# Patient Record
Sex: FEMALE | URBAN - METROPOLITAN AREA
[De-identification: names, ages, dates, MRNs, and addresses within clinical notes are randomized per-mention and may not be internally consistent; named-entity substitution may affect disease eponyms.]

---

## 2024-11-13 ENCOUNTER — TRANSCRIBE ORDERS (OUTPATIENT)
Facility: HOSPITAL | Age: 19
End: 2024-11-13

## 2024-11-13 DIAGNOSIS — M62.838 SPASM OF MUSCLE: Primary | ICD-10-CM

## 2024-12-27 ENCOUNTER — HOSPITAL ENCOUNTER (OUTPATIENT)
Facility: HOSPITAL | Age: 19
Discharge: HOME | End: 2024-12-30
Attending: STUDENT IN AN ORGANIZED HEALTH CARE EDUCATION/TRAINING PROGRAM
Payer: MEDICAID

## 2024-12-27 DIAGNOSIS — M62.838 SPASM OF MUSCLE: ICD-10-CM

## 2024-12-27 PROCEDURE — 70336 MAGNETIC IMAGE JAW JOINT: CPT

## 2025-03-06 ENCOUNTER — HOSPITAL ENCOUNTER (EMERGENCY)
Facility: HOSPITAL | Age: 20
Discharge: HOME | End: 2025-03-06
Attending: EMERGENCY MEDICINE
Payer: MEDICAID

## 2025-03-06 ENCOUNTER — APPOINTMENT (OUTPATIENT)
Facility: HOSPITAL | Age: 20
End: 2025-03-06
Payer: MEDICAID

## 2025-03-06 VITALS
RESPIRATION RATE: 15 BRPM | TEMPERATURE: 98.4 F | HEIGHT: 69 IN | DIASTOLIC BLOOD PRESSURE: 65 MMHG | OXYGEN SATURATION: 98 % | HEART RATE: 85 BPM | BODY MASS INDEX: 24.44 KG/M2 | WEIGHT: 165 LBS | SYSTOLIC BLOOD PRESSURE: 126 MMHG

## 2025-03-06 DIAGNOSIS — S03.00XA DISLOCATION OF TEMPOROMANDIBULAR JOINT, INITIAL ENCOUNTER: ICD-10-CM

## 2025-03-06 DIAGNOSIS — S03.00XA JAW DISLOCATION, INITIAL ENCOUNTER: Primary | ICD-10-CM

## 2025-03-06 PROCEDURE — 70486 CT MAXILLOFACIAL W/O DYE: CPT

## 2025-03-06 PROCEDURE — 99285 EMERGENCY DEPT VISIT HI MDM: CPT

## 2025-03-06 PROCEDURE — 21480 CLTX TMPRMAND DISLC 1ST/SBSQ: CPT

## 2025-03-06 PROCEDURE — 99152 MOD SED SAME PHYS/QHP 5/>YRS: CPT

## 2025-03-06 PROCEDURE — 2580000003 HC RX 258: Performed by: EMERGENCY MEDICINE

## 2025-03-06 PROCEDURE — 6360000002 HC RX W HCPCS: Performed by: EMERGENCY MEDICINE

## 2025-03-06 RX ORDER — 0.9 % SODIUM CHLORIDE 0.9 %
1000 INTRAVENOUS SOLUTION INTRAVENOUS
Status: COMPLETED | OUTPATIENT
Start: 2025-03-06 | End: 2025-03-06

## 2025-03-06 RX ORDER — KETOROLAC TROMETHAMINE 15 MG/ML
15 INJECTION, SOLUTION INTRAMUSCULAR; INTRAVENOUS
Status: COMPLETED | OUTPATIENT
Start: 2025-03-06 | End: 2025-03-06

## 2025-03-06 RX ORDER — FENTANYL CITRATE 50 UG/ML
50 INJECTION, SOLUTION INTRAMUSCULAR; INTRAVENOUS
Status: COMPLETED | OUTPATIENT
Start: 2025-03-06 | End: 2025-03-06

## 2025-03-06 RX ORDER — MORPHINE SULFATE 4 MG/ML
4 INJECTION, SOLUTION INTRAMUSCULAR; INTRAVENOUS
Status: COMPLETED | OUTPATIENT
Start: 2025-03-06 | End: 2025-03-06

## 2025-03-06 RX ORDER — ONDANSETRON 2 MG/ML
4 INJECTION INTRAMUSCULAR; INTRAVENOUS
Status: COMPLETED | OUTPATIENT
Start: 2025-03-06 | End: 2025-03-06

## 2025-03-06 RX ADMIN — FENTANYL CITRATE 50 MCG: 50 INJECTION, SOLUTION INTRAMUSCULAR; INTRAVENOUS at 04:01

## 2025-03-06 RX ADMIN — MORPHINE SULFATE 4 MG: 4 INJECTION, SOLUTION INTRAMUSCULAR; INTRAVENOUS at 02:39

## 2025-03-06 RX ADMIN — ONDANSETRON 4 MG: 2 INJECTION, SOLUTION INTRAMUSCULAR; INTRAVENOUS at 02:39

## 2025-03-06 RX ADMIN — KETOROLAC TROMETHAMINE 15 MG: 15 INJECTION, SOLUTION INTRAMUSCULAR; INTRAVENOUS at 03:33

## 2025-03-06 RX ADMIN — SODIUM CHLORIDE 1000 ML: 0.9 INJECTION, SOLUTION INTRAVENOUS at 03:08

## 2025-03-06 RX ADMIN — PROPOFOL 50 MG: 10 INJECTION, EMULSION INTRAVENOUS at 03:11

## 2025-03-06 ASSESSMENT — LIFESTYLE VARIABLES
HOW OFTEN DO YOU HAVE A DRINK CONTAINING ALCOHOL: NEVER
HOW MANY STANDARD DRINKS CONTAINING ALCOHOL DO YOU HAVE ON A TYPICAL DAY: PATIENT DOES NOT DRINK

## 2025-03-06 ASSESSMENT — PAIN - FUNCTIONAL ASSESSMENT: PAIN_FUNCTIONAL_ASSESSMENT: 0-10

## 2025-03-06 ASSESSMENT — PAIN SCALES - GENERAL
PAINLEVEL_OUTOF10: 10
PAINLEVEL_OUTOF10: 8

## 2025-03-06 NOTE — ED PROVIDER NOTES
Reference:  The patient has a patent airway with a Mallampati Classification Score of III (soft palate, base of uvula visible). Due to jaw dislocation limiting view.        Sedation was performed by:   Janice Jarrett MD  Procedure was performed by:  Dr Jarrett    Pre-Procedure Sedation Assessment:  Sedation Start Time: 3:08    Time Out was performed to confirm patient identity, laterality, indication, and contraindications, prior to procedural sedation.    Post-Procedure Sedation Assessment:   Sedation End Time: 3;30  The patient was sedated with a total of 50 mg propofol/DIPRIVAN via IV.    Reversal agents and resuscitation equipment were at the bedside. Reversal agents were not required.    The indicated procedure was completed and the patient tolerated the sedation well with no complications.     Please refer to sedation flowsheet for nursing notes, vital signs, and specific timeline details.    Janice Jarrett MD  3:59 AM EST         Procedure Note - Joint Reduction and Fracture Care    4:02 AM EST  Performed by Janice Jarrett MD       Procedure start time: 3:08  Procedure end time: 3:30  Procedure was performed without a block.   Medications provided as below:  Medications   sodium chloride 0.9 % bolus 1,000 mL (1,000 mLs IntraVENous New Bag 3/6/25 0308)   morphine (PF) injection 4 mg (4 mg IntraVENous Given 3/6/25 0239)   ondansetron (ZOFRAN) injection 4 mg (4 mg IntraVENous Given 3/6/25 0239)   propofol bolus 50 mg (50 mg IntraVENous Given 3/6/25 0311)   ketorolac (TORADOL) injection 15 mg (15 mg IntraVENous Given 3/6/25 0333)   fentaNYL (SUBLIMAZE) injection 50 mcg (50 mcg IntraVENous Given 3/6/25 0401)       Immediately prior to the procedure, the patient was reevaluated and found suitable for the planned procedure and any planned medications.    Immediately prior to the procedure a time out was called to verify the correct patient, procedure, equipment, staff, and marking as appropriate.    Prior to the  procedure, Analgesia was obtained with parenteral analgesics.   To achieve reduction of the patient's  Jaw with two thumb technique successfully reduced bilat dislocation. manipulation was utilized.  The TMJ joint was successfully reduced.     Post reduction x-ray not ordered as patient's jaw closed normally and pt reported it was in place.  The procedure took 16-30 minutes, and pt tolerated well.       CRITICAL CARE TIME   Patient does not meet Critical Care Time, 0 minutes    ED IMPRESSION     1. Jaw dislocation, initial encounter    2. Dislocation of temporomandibular joint, initial encounter          DISPOSITION/PLAN   DISPOSITION Discharge - Pending Orders Complete 03/06/2025 03:59:08 AM   DISPOSITION CONDITION Stable        Discharge Note: The patient is stable for discharge home. The signs, symptoms, diagnosis, and discharge instructions have been discussed, understanding conveyed, and agreed upon. The patient is to follow up as recommended or return to ER should their symptoms worsen.      PATIENT REFERRED TO:    Keep your appointment with Oral Surgery today at [facility] as scheduled.    Minimize jaw usage/talking/etc as it is currently in place at this time.    If it comes out again, keep your appointment at [facility] and they will put it back.            DISCHARGE MEDICATIONS:     Medication List      You have not been prescribed any medications.           DISCONTINUED MEDICATIONS:  There are no discharge medications for this patient.      I am the Primary Clinician of Record: Janice Jarrett MD (electronically signed)    (Please note that parts of this dictation were completed with voice recognition software. Quite often unanticipated grammatical, syntax, homophones, and other interpretive errors are inadvertently transcribed by the computer software. Please disregards these errors. Please excuse any errors that have escaped final proofreading.)     Janice Jarrett MD  03/12/25 0337

## 2025-03-06 NOTE — DISCHARGE INSTRUCTIONS
Thank you for choosing our Emergency Department for your care.  It is our privilege to care for you in your time of need.  In the next several days, you may receive a survey via email or mailed to your home about your experience with our team.  We would greatly appreciate you taking a few minutes to complete the survey, as we use this information to learn what we have done well and what we could be doing better. Thank you for trusting us with your care!    Below you will find a list of your tests from today's visit.   Labs and Radiology Studies  No results found for this or any previous visit (from the past 12 hour(s)).  CT MAXILLOFACIAL WO CONTRAST    Result Date: 3/6/2025  EXAM: CT MAXILLOFACIAL WO CONTRAST INDICATION: recurrent TMJ dislocation COMPARISON: None. CONTRAST:   None. TECHNIQUE:  Multislice helical CT of the facial bones was performed in the axial plane without intravenous contrast administration. Coronal and sagittal reformations were generated.  CT dose reduction was achieved through use of a standardized protocol tailored for this examination and automatic exposure control for dose modulation.  FINDINGS: Bones: No fracture. Bilateral TMJ anterior dislocation measures approximately 1.4 cm. Paranasal sinuses: No acute sinusitis. Orbits: The globes, optic nerves, and extraocular muscles are within normal limits. Base of brain: Limited evaluation. No evidence of pathology. Soft tissues: Moderate adenoid hypertrophy. No evidence of mass. Miscellaneous: N/A     Bilateral anterior TMJ dislocation. No fracture. Electronically signed by Donovan Kyle    ------------------------------------------------------------------------------------------------------------  The evaluation and treatment you received in the Emergency Department were for an urgent problem. It is important that you follow-up with a doctor, nurse practitioner, or physician assistant to:  (1) confirm your diagnosis,  (2) re-evaluation of